# Patient Record
(demographics unavailable — no encounter records)

---

## 2024-12-19 NOTE — HISTORY OF PRESENT ILLNESS
[de-identified] : 74 year old RHD female, presents for follow-up visit, c/o right buttocks pain x 2-3 years. Patient reports acute on chronic intermittent right buttocks pain, occasionally radiating to her right anterior thigh, descried as "dull-aching" and rated 4/10 on the pain scale the last 2 weeks. Patient states that she has been participating in Pilates and took a bad step while on vacation 4 weeks ago, but denies falls or trauma. Patient denies any aggravating factors She admits to taking Ibuprofen, which provided some pain relief. Patient denies any localized lower back pain, numbness, tingling, or weakness to B/L LE. Patient denies prior spinal or hip surgery or HOLLY. Patient is not participating in PT at this time. Patient ambulates without assistance and can perform ADL's independently at baseline.

## 2024-12-19 NOTE — DISCUSSION/SUMMARY
[de-identified] : I went over the pathophysiology of the patient's symptoms in great detail with the patient. I discussed the underlying pathophysiology of the patient's condition in great detail with the patient. I went over the patient's x-rays with them in great detail. At-home strengthening exercises were discussed and demonstrated in great detail with the patient, with an exercise sheet and exercise band being provided. She should focus on light weight and high repetition exercises. Proper hiking stick walking protocol was discussed and demonstrated with the patient. We discussed the use of ice, Tylenol and anti-inflammatories to relieve pain.   All of their questions were answered. They understand and consent to the plan.   FU when she returns from her trip.

## 2024-12-19 NOTE — ADDENDUM
[FreeTextEntry1] : I, PAUL GANT, acted solely as a scribe for Dr. Alexei Jackson on this date 12/19/2024.  All medical record entries made by the Scribe were at my, Dr. Alexei Jackson, direction and personally dictated by me on 12/19/2024. I have reviewed the chart and agree that the record accurately reflects my personal performance of the history, physical exam, assessment and plan. I have also personally directed, reviewed, and agreed with the chart.

## 2024-12-19 NOTE — PHYSICAL EXAM
[de-identified] : Constitutional o Appearance : well-nourished, well developed, alert, in no acute distress  Head and Face o Head :  Inspection : atraumatic, normocephalic o Face :  Inspection : no visible rash or discoloration Respiratory o Respiratory Effort: breathing unlabored  Neurologic o Mental Status Examination :  Orientation : alert and oriented X 3 Psychiatric o Mood and Affect: mood normal, affect appropriate Cardiovascular o Observation/Palpation : no swelling Lymphatic o Additional Nodes : No palpable lymph nodes present  Lumbosacral Spine o Inspection : no visible rash or discoloration o Palpation : mild right sciatic notch tenderness  o Range of Motion : side bending to the left causes right buttock discomfort,  o Muscle Strength : paraspinal muscle strength and tone within normal limits o Muscle Tone : paraspinal muscle strength and tone within normal limits o Tests: straight leg test negative bilaterally, SURESH test negative bilaterally   Right Lower Extremity o Buttock : no tenderness, swelling or deformities o Right Hip :  Inspection/Palpation : no tenderness, no swelling or deformities  Range of Motion : full and painless in all planes, no crepitance  Stability : joint stability intact  Strength : extension 4-/5, flexion, adduction, abduction 4-/5 internal rotation and external rotation 4-/5   Tests: Obers test negative  Left Lower Extremity o Buttock : no tenderness, swelling or deformities  o Left Hip :  Inspection/Palpation : no tenderness, no swelling or deformities  Range of Motion : full and painless in all planes, no crepitance  Stability : joint stability intact  Strength : extension, flexion, adduction, abduction, internal rotation and external rotation 5/5  Tests: Obers test negative  Gait: gait normal, no significant extremity swelling or lymphedema, good proprioception and balance  Radiology Results 12/19/2024  o Lumbosacral Spine : AP and lateral views were obtained and revealed diffuse facet arthropathy, no evident compression fractures, no lytic lesions  o Hip and Pelvis : AP pelvis was obtained and reveals moderate degenerative arthritis of both hips and moderate left SIJ

## 2025-07-01 NOTE — HEALTH RISK ASSESSMENT
[No falls in past year] : Patient reported no falls in the past year [Patient reported mammogram was normal] : Patient reported mammogram was normal [] :  [Fully functional (bathing, dressing, toileting, transferring, walking, feeding)] : Fully functional (bathing, dressing, toileting, transferring, walking, feeding) [Fully functional (using the telephone, shopping, preparing meals, housekeeping, doing laundry, using] : Fully functional and needs no help or supervision to perform IADLs (using the telephone, shopping, preparing meals, housekeeping, doing laundry, using transportation, managing medications and managing finances) [With Patient/Caregiver] : , with patient/caregiver [Reviewed no changes] : Reviewed, no changes [Language] : denies difficulty with language [MammogramDate] : 5/25 [AdvancecareDate] : 7/25

## 2025-07-01 NOTE — HISTORY OF PRESENT ILLNESS
[FreeTextEntry1] : cpx [de-identified] : seeing ortho for right hand synovitis ([place])--on intermitttent nsaids on fosamax till 6 months ago--DEXA worsening --to see endo seeing derm for skin lesion on left leg home bps 120-130's no barriers to medication admin or controlled substance use optho, derm

## 2025-07-01 NOTE — ASSESSMENT
[Vaccines Reviewed] : Immunizations reviewed today. Please see immunization details in the vaccine log within the immunization flowsheet.  [FreeTextEntry1] : discussed with pt that she meets criteria for statin tx--crestor 10mg/day fasting labs in 3-4 months